# Patient Record
Sex: MALE | Race: WHITE | Employment: FULL TIME | URBAN - METROPOLITAN AREA
[De-identification: names, ages, dates, MRNs, and addresses within clinical notes are randomized per-mention and may not be internally consistent; named-entity substitution may affect disease eponyms.]

---

## 2017-09-14 ENCOUNTER — TRANSFERRED RECORDS (OUTPATIENT)
Dept: HEALTH INFORMATION MANAGEMENT | Facility: CLINIC | Age: 52
End: 2017-09-14

## 2017-09-28 ENCOUNTER — TRANSFERRED RECORDS (OUTPATIENT)
Dept: HEALTH INFORMATION MANAGEMENT | Facility: CLINIC | Age: 52
End: 2017-09-28

## 2017-10-17 ENCOUNTER — TRANSFERRED RECORDS (OUTPATIENT)
Dept: HEALTH INFORMATION MANAGEMENT | Facility: CLINIC | Age: 52
End: 2017-10-17

## 2018-06-01 ENCOUNTER — TRANSFERRED RECORDS (OUTPATIENT)
Dept: HEALTH INFORMATION MANAGEMENT | Facility: CLINIC | Age: 53
End: 2018-06-01

## 2018-07-08 ENCOUNTER — TELEPHONE (OUTPATIENT)
Dept: OTOLARYNGOLOGY | Facility: CLINIC | Age: 53
End: 2018-07-08

## 2018-07-09 NOTE — TELEPHONE ENCOUNTER
Health Call Center    Phone Message    May a detailed message be left on voicemail: yes    Reason for Call: Other: Received referral in Referrals on 7/6 from [facility]. Dr. Hanan Rosenstein is referring patient to ENT for Dizziness. Referral has been forwarded to the clinic. Please contact the patient directly for scheduling.     Action Taken: Message routed to:  [facility] ([facility] Clinic

## 2018-07-24 NOTE — TELEPHONE ENCOUNTER
Referring requesting update - Pt has not received any phone calls. Pt has records located at [facility].

## 2018-07-30 ENCOUNTER — TELEPHONE (OUTPATIENT)
Dept: OTOLARYNGOLOGY | Facility: CLINIC | Age: 53
End: 2018-07-30

## 2018-07-30 NOTE — TELEPHONE ENCOUNTER
Left patient voicemail regarding scheduling appointment for dizziness with Dr Nissen or Dr Rivera with an audio prior. Gave patient my direct phone number to contact.    7-30-18  Patient left voicemail stating he would be hard to get a hold of and to schedule him.    7-31-18  Left patient voicemail regarding scheduling appointments for dizziness. Gave him a date and time of Sep 4, 12:30 hearing test and 1:30 Dr Nissen. I also explained to patient I needed information where he has been seen, [facility], 2 ENT providers and if any imaging, audios, or balance testing was done. Gave patient my direct phone number to contact.      Tiffany  [facility] Coordinator

## 2018-08-18 ENCOUNTER — APPOINTMENT (OUTPATIENT)
Dept: GENERAL RADIOLOGY | Facility: CLINIC | Age: 53
End: 2018-08-18
Attending: EMERGENCY MEDICINE
Payer: COMMERCIAL

## 2018-08-18 ENCOUNTER — HOSPITAL ENCOUNTER (OUTPATIENT)
Facility: CLINIC | Age: 53
Setting detail: OBSERVATION
Discharge: HOME | End: 2018-08-19
Attending: EMERGENCY MEDICINE | Admitting: EMERGENCY MEDICINE
Payer: COMMERCIAL

## 2018-08-18 DIAGNOSIS — R07.9 CHEST PAIN, UNSPECIFIED TYPE: ICD-10-CM

## 2018-08-18 DIAGNOSIS — Z86.79 PERSONAL HISTORY OF CARDIOVASCULAR DISORDER: ICD-10-CM

## 2018-08-18 DIAGNOSIS — Z82.49 FAMILY HISTORY OF ISCHEMIC HEART DISEASE: ICD-10-CM

## 2018-08-18 LAB
ANION GAP SERPL CALCULATED.3IONS-SCNC: 7 MMOL/L (ref 3–14)
BASOPHILS # BLD AUTO: 0 10E9/L (ref 0–0.2)
BASOPHILS NFR BLD AUTO: 0.3 %
BUN SERPL-MCNC: 11 MG/DL (ref 7–30)
CALCIUM SERPL-MCNC: 9 MG/DL (ref 8.5–10.1)
CHLORIDE SERPL-SCNC: 104 MMOL/L (ref 94–109)
CO2 SERPL-SCNC: 27 MMOL/L (ref 20–32)
CREAT SERPL-MCNC: 0.8 MG/DL (ref 0.66–1.25)
DIFFERENTIAL METHOD BLD: ABNORMAL
EOSINOPHIL # BLD AUTO: 0.1 10E9/L (ref 0–0.7)
EOSINOPHIL NFR BLD AUTO: 2.5 %
ERYTHROCYTE [DISTWIDTH] IN BLOOD BY AUTOMATED COUNT: 13 % (ref 10–15)
GFR SERPL CREATININE-BSD FRML MDRD: >90 ML/MIN/1.7M2
GLUCOSE SERPL-MCNC: 96 MG/DL (ref 70–99)
HCT VFR BLD AUTO: 41.4 % (ref 40–53)
HGB BLD-MCNC: 14.2 G/DL (ref 13.3–17.7)
IMM GRANULOCYTES # BLD: 0 10E9/L (ref 0–0.4)
IMM GRANULOCYTES NFR BLD: 0 %
INTERPRETATION ECG - MUSE: NORMAL
LYMPHOCYTES # BLD AUTO: 1.3 10E9/L (ref 0.8–5.3)
LYMPHOCYTES NFR BLD AUTO: 39.5 %
MAGNESIUM SERPL-MCNC: 2 MG/DL (ref 1.6–2.3)
MCH RBC QN AUTO: 31.6 PG (ref 26.5–33)
MCHC RBC AUTO-ENTMCNC: 34.3 G/DL (ref 31.5–36.5)
MCV RBC AUTO: 92 FL (ref 78–100)
MONOCYTES # BLD AUTO: 0.3 10E9/L (ref 0–1.3)
MONOCYTES NFR BLD AUTO: 10.3 %
NEUTROPHILS # BLD AUTO: 1.5 10E9/L (ref 1.6–8.3)
NEUTROPHILS NFR BLD AUTO: 47.4 %
NRBC # BLD AUTO: 0 10*3/UL
NRBC BLD AUTO-RTO: 0 /100
PLATELET # BLD AUTO: 185 10E9/L (ref 150–450)
POTASSIUM SERPL-SCNC: 3.8 MMOL/L (ref 3.4–5.3)
RBC # BLD AUTO: 4.5 10E12/L (ref 4.4–5.9)
SODIUM SERPL-SCNC: 138 MMOL/L (ref 133–144)
TROPONIN I SERPL-MCNC: <0.015 UG/L (ref 0–0.04)
WBC # BLD AUTO: 3.2 10E9/L (ref 4–11)

## 2018-08-18 PROCEDURE — 85025 COMPLETE CBC W/AUTO DIFF WBC: CPT | Performed by: EMERGENCY MEDICINE

## 2018-08-18 PROCEDURE — 96374 THER/PROPH/DIAG INJ IV PUSH: CPT

## 2018-08-18 PROCEDURE — 93005 ELECTROCARDIOGRAM TRACING: CPT

## 2018-08-18 PROCEDURE — 80048 BASIC METABOLIC PNL TOTAL CA: CPT | Performed by: EMERGENCY MEDICINE

## 2018-08-18 PROCEDURE — 93010 ELECTROCARDIOGRAM REPORT: CPT | Performed by: INTERNAL MEDICINE

## 2018-08-18 PROCEDURE — 99285 EMERGENCY DEPT VISIT HI MDM: CPT | Mod: 25 | Performed by: EMERGENCY MEDICINE

## 2018-08-18 PROCEDURE — 36415 COLL VENOUS BLD VENIPUNCTURE: CPT | Performed by: NURSE PRACTITIONER

## 2018-08-18 PROCEDURE — 83735 ASSAY OF MAGNESIUM: CPT | Performed by: NURSE PRACTITIONER

## 2018-08-18 PROCEDURE — 99219 ZZC INITIAL OBSERVATION CARE,LEVL II: CPT | Mod: Z6 | Performed by: EMERGENCY MEDICINE

## 2018-08-18 PROCEDURE — 93005 ELECTROCARDIOGRAM TRACING: CPT | Performed by: EMERGENCY MEDICINE

## 2018-08-18 PROCEDURE — 25000128 H RX IP 250 OP 636: Performed by: NURSE PRACTITIONER

## 2018-08-18 PROCEDURE — G0378 HOSPITAL OBSERVATION PER HR: HCPCS

## 2018-08-18 PROCEDURE — 84484 ASSAY OF TROPONIN QUANT: CPT | Performed by: NURSE PRACTITIONER

## 2018-08-18 PROCEDURE — 25000132 ZZH RX MED GY IP 250 OP 250 PS 637: Performed by: EMERGENCY MEDICINE

## 2018-08-18 PROCEDURE — 96375 TX/PRO/DX INJ NEW DRUG ADDON: CPT

## 2018-08-18 PROCEDURE — 84484 ASSAY OF TROPONIN QUANT: CPT | Performed by: EMERGENCY MEDICINE

## 2018-08-18 PROCEDURE — 71046 X-RAY EXAM CHEST 2 VIEWS: CPT

## 2018-08-18 RX ORDER — ASPIRIN 325 MG
325 TABLET ORAL DAILY
Status: DISCONTINUED | OUTPATIENT
Start: 2018-08-19 | End: 2018-08-19 | Stop reason: HOSPADM

## 2018-08-18 RX ORDER — ALUMINA, MAGNESIA, AND SIMETHICONE 2400; 2400; 240 MG/30ML; MG/30ML; MG/30ML
30 SUSPENSION ORAL EVERY 4 HOURS PRN
Status: DISCONTINUED | OUTPATIENT
Start: 2018-08-18 | End: 2018-08-19 | Stop reason: HOSPADM

## 2018-08-18 RX ORDER — METOCLOPRAMIDE 10 MG/1
10 TABLET ORAL EVERY 6 HOURS PRN
Status: DISCONTINUED | OUTPATIENT
Start: 2018-08-18 | End: 2018-08-19 | Stop reason: HOSPADM

## 2018-08-18 RX ORDER — PROCHLORPERAZINE 25 MG
25 SUPPOSITORY, RECTAL RECTAL EVERY 12 HOURS PRN
Status: DISCONTINUED | OUTPATIENT
Start: 2018-08-18 | End: 2018-08-19 | Stop reason: HOSPADM

## 2018-08-18 RX ORDER — LIDOCAINE 40 MG/G
CREAM TOPICAL
Status: DISCONTINUED | OUTPATIENT
Start: 2018-08-18 | End: 2018-08-19 | Stop reason: HOSPADM

## 2018-08-18 RX ORDER — NITROGLYCERIN 0.4 MG/1
0.4 TABLET SUBLINGUAL EVERY 5 MIN PRN
Status: DISCONTINUED | OUTPATIENT
Start: 2018-08-18 | End: 2018-08-18

## 2018-08-18 RX ORDER — NITROGLYCERIN 0.4 MG/1
0.4 TABLET SUBLINGUAL EVERY 5 MIN PRN
Status: DISCONTINUED | OUTPATIENT
Start: 2018-08-18 | End: 2018-08-19 | Stop reason: HOSPADM

## 2018-08-18 RX ORDER — ONDANSETRON 4 MG/1
4 TABLET, ORALLY DISINTEGRATING ORAL EVERY 6 HOURS PRN
Status: DISCONTINUED | OUTPATIENT
Start: 2018-08-18 | End: 2018-08-19 | Stop reason: HOSPADM

## 2018-08-18 RX ORDER — PROCHLORPERAZINE MALEATE 5 MG
10 TABLET ORAL EVERY 6 HOURS PRN
Status: DISCONTINUED | OUTPATIENT
Start: 2018-08-18 | End: 2018-08-19 | Stop reason: HOSPADM

## 2018-08-18 RX ORDER — ACETAMINOPHEN 325 MG/1
650 TABLET ORAL EVERY 4 HOURS PRN
Status: DISCONTINUED | OUTPATIENT
Start: 2018-08-18 | End: 2018-08-19 | Stop reason: HOSPADM

## 2018-08-18 RX ORDER — ACETAMINOPHEN 650 MG/1
650 SUPPOSITORY RECTAL EVERY 4 HOURS PRN
Status: DISCONTINUED | OUTPATIENT
Start: 2018-08-18 | End: 2018-08-19 | Stop reason: HOSPADM

## 2018-08-18 RX ORDER — NALOXONE HYDROCHLORIDE 0.4 MG/ML
.1-.4 INJECTION, SOLUTION INTRAMUSCULAR; INTRAVENOUS; SUBCUTANEOUS
Status: DISCONTINUED | OUTPATIENT
Start: 2018-08-18 | End: 2018-08-19 | Stop reason: HOSPADM

## 2018-08-18 RX ORDER — ONDANSETRON 2 MG/ML
4 INJECTION INTRAMUSCULAR; INTRAVENOUS EVERY 6 HOURS PRN
Status: DISCONTINUED | OUTPATIENT
Start: 2018-08-18 | End: 2018-08-19 | Stop reason: HOSPADM

## 2018-08-18 RX ORDER — METOCLOPRAMIDE HYDROCHLORIDE 5 MG/ML
10 INJECTION INTRAMUSCULAR; INTRAVENOUS EVERY 6 HOURS PRN
Status: DISCONTINUED | OUTPATIENT
Start: 2018-08-18 | End: 2018-08-19 | Stop reason: HOSPADM

## 2018-08-18 RX ADMIN — NITROGLYCERIN 0.4 MG: 0.4 TABLET SUBLINGUAL at 09:46

## 2018-08-18 RX ADMIN — PROCHLORPERAZINE EDISYLATE 10 MG: 5 INJECTION INTRAMUSCULAR; INTRAVENOUS at 21:47

## 2018-08-18 RX ADMIN — ONDANSETRON HYDROCHLORIDE 4 MG: 2 INJECTION, SOLUTION INTRAMUSCULAR; INTRAVENOUS at 19:39

## 2018-08-18 ASSESSMENT — ENCOUNTER SYMPTOMS
HEADACHES: 0
COLOR CHANGE: 0
ABDOMINAL PAIN: 0
NAUSEA: 0
CONFUSION: 0
DIFFICULTY URINATING: 0
EYE REDNESS: 0
VOMITING: 0
FEVER: 0
SHORTNESS OF BREATH: 0
FATIGUE: 1
NECK STIFFNESS: 0
ARTHRALGIAS: 0

## 2018-08-18 NOTE — IP AVS SNAPSHOT
MRN:[id number]                      After Visit Summary   8/18/2018    Michael Freeman    MRN: [id number]           Thank you!     Thank you for choosing [facility] for your care. Our goal is always to provide you with excellent care. Hearing back from our patients is one way we can continue to improve our services. Please take a few minutes to complete the written survey that you may receive in the mail after you visit with us. Thank you!        Patient Information     Date Of Birth          1965        About your hospital stay     You were admitted on:  August 18, 2018 You last received care in the:  Unit 6D Observation [facility]    You were discharged on:  August 19, 2018        Reason for your hospital stay       Chest pain                  Who to Call     For medical emergencies, please call 911.  For non-urgent questions about your medical care, please call your primary care provider or clinic, [phone]          Attending Provider     Provider Specialty    Rene Posey DO Emergency Medicine    Jeff Nick MD Emergency Medicine       Primary Care Provider Office Phone # Fax #    Hanan J Rosenstein, [phone]       When to contact your care team       Please go to your nearest emergency room If you were to have a change in the type of chest pain i.e more severe, lasting longer or radiating to your shoulder, arm, neck, jaw or back, shortness of breath or increased pain with breathing, coughing up blood, feel dizzy or lightheaded, or notice swelling in one leg.                  After Care Instructions     Activity       Your activity upon discharge: As tolerated            Diet       Follow this diet upon discharge: Regular diet            Discharge Instructions       The chest pain you came into the emergency room for does not appear to be cardiac chest pain. Your heart enzymes were normal which tells us there was no damage to the heart muscle. Your  stress test showed possible bicuspid aortic valve with mild insufficiency. Recommend outpatient CTA to better assess aortic valve.                  Follow-up Appointments     Adult [facility] Follow-up and recommended labs and tests       Follow up with PCP in one week, return to the ER if having chest pain, syncope, shortness of breath or fever greater than 101F.     Appointments on [facility] and/or [facility] (with [facility] or [facility] provider or service). Call [phone] if you haven't heard regarding these appointments within 7 days of discharge.                  Your next 10 appointments already scheduled     Sep 04, 2018 12:30 PM CDT   Walk In From ENT with Fareed Montez   [facility] Audiology ([facility])    [address] [place] [address] [phone]            Sep 04, 2018  1:30 PM CDT   (Arrive by 1:15 PM)   NEW NEUROTOLOGY VISIT with Rick L Nissen, MD   [facility] Ear Nose and Throat ([facility])    [address] [place] [address] [phone]              Future tests that were ordered for you     CTA Chest with Contrast                 Pending Results     Date and Time Order Name Status Description    8/18/2018 1801 EKG 12-lead, tracing only Preliminary             Statement of Approval     Ordered          08/19/18 1252  I have reviewed and agree with all the recommendations and orders detailed in this document.  EFFECTIVE NOW     Approved and electronically signed by:  Danielle Alaniz APRN CNP             Admission Information     Date & Time Provider Department Dept. Phone    8/18/2018 Jeff Nick MD Unit 6D Observation [facility] [place] [phone]      Your Vitals Were     Blood Pressure Temperature Respirations Weight Pulse Oximetry       104/62 (BP Location: Right arm) 98.6  F (37  C) (Oral) 16 82.6 kg (182 lb) 98%       MyChart Information     [employer] lets you send messages to your  "doctor, view your test results, renew your prescriptions, schedule appointments and more. To sign up, go to [email or web address] . Click on \"Log in\" on the left side of the screen, which will take you to the Welcome page. Then click on \"Sign up Now\" on the right side of the page.     You will be asked to enter the access code listed below, as well as some personal information. Please follow the directions to create your username and password.     Your access code is: [email or web address]  Expires: 10/30/2018  2:31 PM     Your access code will  in 90 days. If you need help or a new code, please call your [facility] clinic or [phone].        Care EveryWhere ID     This is your Care EveryWhere ID. This could be used by other organizations to access your [facility] medical records  [id number]        Equal Access to Services     [facility] CenterADAIR : Harlan Santiago, arun cross, johanna pedroza, adan miller . So [facility] [phone].    ATENCIÓN: Si habla español, tiene a gonzalez disposición servicios gratuitos de asistencia lingüística. Pan al [phone].    We comply with applicable federal civil rights laws and [place] laws. We do not discriminate on the basis of race, color, national origin, age, disability, sex, sexual orientation, or gender identity.               Review of your medicines      CONTINUE these medicines which have NOT CHANGED        Dose / Directions    ASPIRIN PO        Dose:  325 mg   Take 325 mg by mouth   Refills:  0                Protect others around you: Learn how to safely use, store and throw away your medicines at www.disposemymeds.org.             Medication List: This is a list of all your medications and when to take them. Check marks below indicate your daily home schedule. Keep this list as a reference.      Medications           Morning Afternoon Evening Bedtime As Needed    ASPIRIN PO   Take 325 mg by mouth   Last time this was " given:  325 mg on 8/19/2018  8:50 AM

## 2018-08-18 NOTE — PROGRESS NOTES
List all goals to be met before discharge home:   - Serial troponins and stress test complete : No, two has bee negative.  - Seen and cleared by consultant if applicable : No.  - Adequate pain control on oral analgesia : Yes.  - Vital signs normal or at patient baseline : Yes.  - Safe disposition plan has been identified : No.  - Nurse to notify provider when observation goals have been met and patient is ready for discharge.

## 2018-08-18 NOTE — H&P
"[facility] ED Observation Admission Note    Chief Complaint   Patient presents with     Chest Pain     Arm Pain       Assessment/Plan:  Michael Freeman is a 53 year old male with a history of atrial fibrillation s/p ablation 2009 who presents for evaluation of chest pain.     1. Chest pain: Started on Monday.Intermittent chest pressure with left arm paresthesias. Also an episode of scalp numbness. His pain recurred yesterday afternoon and has been constant, though fluctuating in severity, since onset. He reports nausea. He denies vomiting. No leg pain or swelling. Reports a stress test in 2009 which was normal prior to his ablation. No recent travel. Extensive maternal family history of heart disease  - Serial troponins q4h x 2 more  - Continuous telemetry  - Exercise Stress Test in the morning  - Nitro PRN  - Continue PTA ASA 325mg daily  - Clear liquid diet after midnight  - PPI IV BID     Disposition: Home tomorrow if troponins negative and stress test negative.     HPI:    Per ED note \"Michael Freeman is a 53 year old male with a history of atrial fibrillation s/p ablation who presents for evaluation of chest pain. Patient complains of left-sided chest pain with associated left arm discomfort and weakness that has been intermittent since Monday, for the past 5 days. He has had associated fatigue as well as an episode of scalp numbness that has since resolved. He has not noticed any specific exacerbating or alleviating factors. He reports his episode of pain that occurred on Monday lasted for several hours and took a couple of days to subside. His pain recurred yesterday afternoon and has been constant, though fluctuating in severity, since onset. He denies nausea or vomiting. No leg pain or swelling. He takes daily 325 mg aspirin, and did take this medication this morning prior to arrival. No recent travel. He does have extensive maternal family history of heart disease.\"    In the ED   Vitals:BP: 129/83 Pulse: 62 Temp: " 97.4 SP02:99% Labs: Na 138, K 3.8, Cr 0.80, Trop <0.015 x 2, WBC 3.2, Hgb 14.2, Plt 185 Medications:Nitro x 1 Imaging: EKG: normal, Chest xray: Normal cardiac and mediastinal silhouette. Pulmonary  vasculature within normal limits. No acute airspace opacity. Presumed  calcified nodule in the left midlung. Consults: none Plan: Admit to ED observation for ACS r/o.     On admission to the observation unit the patient was stable. Denied any chest pain or arm pain.     History:    Past Medical History:   Diagnosis Date     Atrial fibrillation, unspecified type (H) 2009    has had an ablation       Past Surgical History:   Procedure Laterality Date     WISDOM ST GUIDEWIRE         No family history on file.    Social History     Social History     Marital status:      Spouse name: N/A     Number of children: N/A     Years of education: N/A     Occupational History     Not on file.     Social History Main Topics     Smoking status: Never Smoker     Smokeless tobacco: Never Used     Alcohol use Not on file     Drug use: Not on file     Sexual activity: Not on file     Other Topics Concern     Not on file     Social History Narrative     No narrative on file         No current facility-administered medications on file prior to encounter.   No current outpatient prescriptions on file prior to encounter.    Data:    Results for orders placed or performed during the hospital encounter of 08/18/18   CBC with platelets differential   Result Value Ref Range    WBC 3.2 (L) 4.0 - 11.0 10e9/L    RBC Count 4.50 4.4 - 5.9 10e12/L    Hemoglobin 14.2 13.3 - 17.7 g/dL    Hematocrit 41.4 40.0 - 53.0 %    MCV 92 78 - 100 fl    MCH 31.6 26.5 - 33.0 pg    MCHC 34.3 31.5 - 36.5 g/dL    RDW 13.0 10.0 - 15.0 %    Platelet Count 185 150 - 450 10e9/L    Diff Method Automated Method     % Neutrophils 47.4 %    % Lymphocytes 39.5 %    % Monocytes 10.3 %    % Eosinophils 2.5 %    % Basophils 0.3 %    % Immature Granulocytes 0.0 %    Nucleated  RBCs 0 0 /100    Absolute Neutrophil 1.5 (L) 1.6 - 8.3 10e9/L    Absolute Lymphocytes 1.3 0.8 - 5.3 10e9/L    Absolute Monocytes 0.3 0.0 - 1.3 10e9/L    Absolute Eosinophils 0.1 0.0 - 0.7 10e9/L    Absolute Basophils 0.0 0.0 - 0.2 10e9/L    Abs Immature Granulocytes 0.0 0 - 0.4 10e9/L    Absolute Nucleated RBC 0.0    Basic metabolic panel   Result Value Ref Range    Sodium 138 133 - 144 mmol/L    Potassium 3.8 3.4 - 5.3 mmol/L    Chloride 104 94 - 109 mmol/L    Carbon Dioxide 27 20 - 32 mmol/L    Anion Gap 7 3 - 14 mmol/L    Glucose 96 70 - 99 mg/dL    Urea Nitrogen 11 7 - 30 mg/dL    Creatinine 0.80 0.66 - 1.25 mg/dL    GFR Estimate >90 >60 mL/min/1.7m2    GFR Estimate If Black >90 >60 mL/min/1.7m2    Calcium 9.0 8.5 - 10.1 mg/dL   Troponin I   Result Value Ref Range    Troponin I ES <0.015 0.000 - 0.045 ug/L   EKG 12-lead, tracing only   Result Value Ref Range    Interpretation ECG Click View Image link to view waveform and result              EKG Interpretation:      Interpreted by Rene Posey  Time reviewed: 0924  Symptoms at time of EKG: Chest pain  Rhythm: Normal sinus rhythm  Rate: 61  Axis: Normal  Ectopy: none  Conduction: normal  ST Segments/ T Waves: No ST-T wave changes  Q Waves: none  Comparison to prior: No old EKG available     Clinical Impression: normal EKG  ROS:    Review Of Systems  Constitutional:Negative for fever.   HENT: Negative for congestion.    Eyes: Negative for redness.   Respiratory: Negative for shortness of breath.    Cardiovascular: Positive for chest pain (left-sided x5 days). Negative for leg swelling.   Gastrointestinal: Negative for abdominal pain, nausea and vomiting.   Genitourinary: Negative for difficulty urinating.   Musculoskeletal: Negative for arthralgias and neck stiffness.   Skin: Negative for color change.   Neurological: scalp numbness, left arm numbness  Psychiatric/Behavioral: Negative for confusion.     10 point ROS negative other than the symptoms noted  above.      Exam:    Vitals:  B/P: 95/76, T: 97.4, P: Data Unavailable, R: Data Unavailable    Constitutional: healthy, alert and no distress   Head: Normocephalic. No masses, lesions, tenderness or abnormalities   Neck: Neck supple. No adenopathy. Thyroid symmetric, normal size,, Carotids without bruits.   ENT: ENT exam normal, no neck nodes or sinus tenderness   Cardiovascular: RRR. No murmurs, clicks gallops or rub   Respiratory: . Good diaphragmatic excursion. Lungs clear   Gastrointestinal: Abdomen soft,. BS normal. No masses, organomegaly.   : Deferred   Musculoskeletal: extremities normal- no gross deformities noted, gait normal and normal muscle tone   Skin: no suspicious lesions or rashes   Neurologic: Gait normal. Reflexes normal and symmetric. Sensation grossly WNL.   Psychiatric: mentation appears normal and affect normal/bright   Hematologic/Lymphatic/Immunologic: normal ant/post cervical, axillary, supraclavicular and inguinal     Consults: None  FEN: Regular diet/Clear liquid diet at midnight  DVT prophylaxis: Early ambulation  Disposition: Home tomorrow if troponins negative and stress test negative.     Signed:  DEDRA Velasquez, NP  Emergency Department  [phone] Ex [id number]  August 18, 2018 at 12:43 PM    Addendum: Reported nausea, diaphoresis, dizziness. No events noted on cardiac monitoring. Troponin drawn. EKG repeated.

## 2018-08-18 NOTE — PROGRESS NOTES
Reason for admission: Chest pain.  Primary team notified of pt arrival.  Admitted from: ED.  Via: stretcher  Accompanied by: family  Belongings: Placed in closet.  Admission Profile: complete  Teaching: orientation to unit and call light- call light within reach, call don't fall, use of console, meal times, when to call for the RN, and enforced importance of safety   Access: PIV  Ht./Wt.: complete  Pt status: Stable.

## 2018-08-18 NOTE — ED PROVIDER NOTES
History     Chief Complaint   Patient presents with     Chest Pain     Arm Pain     HPI  Michael Freeman is a 53 year old male with a history of atrial fibrillation s/p ablation who presents for evaluation of chest pain. Patient complains of left-sided chest pain with associated left arm discomfort and weakness that has been intermittent since Monday, for the past 5 days. He has had associated fatigue as well as an episode of scalp numbness that has since resolved. He has not noticed any specific exacerbating or alleviating factors. He reports his episode of pain that occurred on Monday lasted for several hours and took a couple of days to subside. His pain recurred yesterday afternoon and has been constant, though fluctuating in severity, since onset. He denies nausea or vomiting. No leg pain or swelling. He takes daily 325 mg aspirin, and did take this medication this morning prior to arrival. No recent travel. He does have extensive maternal family history of heart disease.     I have reviewed the Medications, Allergies, Past Medical and Surgical History, and Social History in the [employer] system.  Past Medical History:   Diagnosis Date     Atrial fibrillation, unspecified type (H)     has had an ablation       History reviewed. No pertinent surgical history.    No family history on file.    Social History   Substance Use Topics     Smoking status: Never Smoker     Smokeless tobacco: Never Used     Alcohol use Not on file       Current Facility-Administered Medications   Medication     nitroGLYcerin (NITROSTAT) sublingual tablet 0.4 mg     Current Outpatient Prescriptions   Medication     ASPIRIN PO      No Known Allergies    Review of Systems   Constitutional: Positive for fatigue. Negative for fever.   HENT: Negative for congestion.    Eyes: Negative for redness.   Respiratory: Negative for shortness of breath.    Cardiovascular: Positive for chest pain (left-sided x5 days). Negative for leg swelling.    Gastrointestinal: Negative for abdominal pain, nausea and vomiting.   Genitourinary: Negative for difficulty urinating.   Musculoskeletal: Negative for arthralgias and neck stiffness.   Skin: Negative for color change.   Neurological: Negative for headaches.   Psychiatric/Behavioral: Negative for confusion.   All other systems reviewed and are negative.      Physical Exam   BP: 129/83  Heart Rate: 62  Temp: 97.4  F (36.3  C)  Weight: 82.6 kg (182 lb)  SpO2: 99 %      Physical Exam   Constitutional: No distress.   HENT:   Head: Atraumatic.   Mouth/Throat: Oropharynx is clear and moist. No oropharyngeal exudate.   Eyes: Pupils are equal, round, and reactive to light. No scleral icterus.   Cardiovascular: Normal heart sounds and intact distal pulses.    Pulmonary/Chest: Breath sounds normal. No respiratory distress.   Abdominal: Soft. Bowel sounds are normal. There is no tenderness.   Musculoskeletal: He exhibits no edema or tenderness.   Skin: Skin is warm. No rash noted. He is not diaphoretic.   Nursing note and vitals reviewed.      ED Course     ED Course     Procedures       9:29 AM  The patient was seen and examined by Dr. Posey in Room 22.          EKG Interpretation:      Interpreted by Rene Posey  Time reviewed: 0924  Symptoms at time of EKG: Chest pain  Rhythm: Normal sinus rhythm  Rate: 61  Axis: Normal  Ectopy: none  Conduction: normal  ST Segments/ T Waves: No ST-T wave changes  Q Waves: none  Comparison to prior: No old EKG available    Clinical Impression: normal EKG                Critical Care time:  none             Labs Ordered and Resulted from Time of ED Arrival Up to the Time of Departure from the ED - No data to display         Assessments & Plan (with Medical Decision Making)   This is a 53-year-old male with intermittent chest pain for the past week.  Pain was waxing and waning on its own.  He does have a strong family history of heart disease.  Patient has a history of atrial  fibrillation but is in normal sinus since an ablation.  He took a full dose aspirin today.  EKG showed no abnormalities.  Component was not elevated.  She did receive 1 sublingual nitro with relief from pain.  She is currently pain-free now.  Will admit for observation and further testing.    I have reviewed the nursing notes.    I have reviewed the findings, diagnosis, plan and need for follow up with the patient.    New Prescriptions    No medications on file       Final diagnoses:   None   IThais, am serving as a trained medical scribe to document services personally performed by Rene Psoey DO, based on the provider's statements to me.      Rene CESPEDES DO, was physically present and have reviewed and verified the accuracy of this note documented by Tahis King.      8/18/2018   [facility], [place], EMERGENCY DEPARTMENT     Rene Posey DO  08/18/18 1040

## 2018-08-18 NOTE — PROVIDER NOTIFICATION
At 1755 patient reported that his heart rate is going up to 70's to 80's and feeling of nauseous. Denies shortness of breath, denies pain. I/A Vital signs were taken. Ob's. NP notified. Plan: continue care.Update provider with change/concern.

## 2018-08-18 NOTE — IP AVS SNAPSHOT
Unit 6D Observation [address] [address]    Phone:  [phone]    Fax:  [phone]                                       After Visit Summary   8/18/2018    Michael Freeman    MRN: [id number]           After Visit Summary Signature Page     I have received my discharge instructions, and my questions have been answered. I have discussed any challenges I see with this plan with the nurse or doctor.    ..........................................................................................................................................  Patient/Patient Representative Signature      ..........................................................................................................................................  Patient Representative Print Name and Relationship to Patient    ..................................................               ................................................  Date                                            Time    ..........................................................................................................................................  Reviewed by Signature/Title    ...................................................              ..............................................  Date                                                            Time

## 2018-08-18 NOTE — PLAN OF CARE
Problem: Patient Care Overview  Goal: Individualization & Mutuality  Outpatient/Observation goals to be met before discharge home:    Serial troponins and stress test complete.No, pending the result for the 3rd troponin.   - Seen and cleared by consultant if applicable .No.  - Adequate pain control on oral analgesia.yes.  - Vital signs normal or at patient baseline.Yes.   - Safe disposition plan has been identified.yes, home.

## 2018-08-18 NOTE — ED TRIAGE NOTES
Pt arrived to the ED with c/o chest pain and left arm since Monday afternoon. Per pt the pain resolved on its own during the week and returned yesterday afternoon. On arrival vitals stable, afebrile. Pt alert and oriented x4. Reports chest pain comes and goes, no precipitating factors. Denies SOA. Vitals stable, afebrile. Hx of atrial fibulation with ablation.

## 2018-08-19 ENCOUNTER — APPOINTMENT (OUTPATIENT)
Dept: CARDIOLOGY | Facility: CLINIC | Age: 53
End: 2018-08-19
Attending: NURSE PRACTITIONER
Payer: COMMERCIAL

## 2018-08-19 VITALS
OXYGEN SATURATION: 98 % | DIASTOLIC BLOOD PRESSURE: 62 MMHG | RESPIRATION RATE: 16 BRPM | TEMPERATURE: 98.6 F | SYSTOLIC BLOOD PRESSURE: 104 MMHG | WEIGHT: 182 LBS

## 2018-08-19 PROCEDURE — 25500064 ZZH RX 255 OP 636: Performed by: EMERGENCY MEDICINE

## 2018-08-19 PROCEDURE — 93325 DOPPLER ECHO COLOR FLOW MAPG: CPT | Mod: 26 | Performed by: INTERNAL MEDICINE

## 2018-08-19 PROCEDURE — 93016 CV STRESS TEST SUPVJ ONLY: CPT | Performed by: INTERNAL MEDICINE

## 2018-08-19 PROCEDURE — 99217 ZZC OBSERVATION CARE DISCHARGE: CPT | Mod: Z6 | Performed by: EMERGENCY MEDICINE

## 2018-08-19 PROCEDURE — 96375 TX/PRO/DX INJ NEW DRUG ADDON: CPT

## 2018-08-19 PROCEDURE — G0378 HOSPITAL OBSERVATION PER HR: HCPCS

## 2018-08-19 PROCEDURE — 93018 CV STRESS TEST I&R ONLY: CPT | Performed by: INTERNAL MEDICINE

## 2018-08-19 PROCEDURE — 40000264 ECHO STRESS TEST WITH DEFINITY

## 2018-08-19 PROCEDURE — 93321 DOPPLER ECHO F-UP/LMTD STD: CPT | Mod: 26 | Performed by: INTERNAL MEDICINE

## 2018-08-19 PROCEDURE — 93350 STRESS TTE ONLY: CPT | Mod: 26 | Performed by: INTERNAL MEDICINE

## 2018-08-19 PROCEDURE — 25000125 ZZHC RX 250: Performed by: NURSE PRACTITIONER

## 2018-08-19 PROCEDURE — 25000132 ZZH RX MED GY IP 250 OP 250 PS 637: Performed by: NURSE PRACTITIONER

## 2018-08-19 RX ADMIN — PANTOPRAZOLE SODIUM 40 MG: 40 INJECTION, POWDER, FOR SOLUTION INTRAVENOUS at 08:51

## 2018-08-19 RX ADMIN — PERFLUTREN 5 ML: 6.52 INJECTION, SUSPENSION INTRAVENOUS at 11:18

## 2018-08-19 RX ADMIN — ASPIRIN 325 MG ORAL TABLET 325 MG: 325 PILL ORAL at 08:50

## 2018-08-19 NOTE — PLAN OF CARE
Problem: Patient Care Overview  Goal: Discharge Needs Assessment  - Serial troponins and stress test complete. No. Stress in am.  - Seen and cleared by consultant if applicable. No  - Adequate pain control on oral analgesia. Yes  - Vital signs normal or at patient baseline. Yes  - Safe disposition plan has been identified. No. Pending.

## 2018-08-19 NOTE — PROGRESS NOTES
All goals met for discharge. Discharge instructions given to patient and all questions answered. Patient able to verbalize understanding of instructions. PIV discontinued. All belongings with patient. Family here to provide ride home. Patient discharged to home.

## 2018-08-19 NOTE — PLAN OF CARE
Problem: Patient Care Overview  Goal: Plan of Care/Patient Progress Review  Outpatient/Observation goals to be met before discharge home:    - Serial troponins and stress test complete. Stress test complete. Results pending.  - Seen and cleared by consultant if applicable. No  - Adequate pain control on oral analgesia. Yes  - Vital signs normal or at patient baseline. Yes  - Safe disposition plan has been identified. No. Pending.

## 2018-08-19 NOTE — PLAN OF CARE
Problem: Patient Care Overview  Goal: Plan of Care/Patient Progress Review  Outpatient/Observation goals to be met before discharge home:    - Serial troponins and stress test complete. No. Stress in am.  - Seen and cleared by consultant if applicable. No  - Adequate pain control on oral analgesia. Yes  - Vital signs normal or at patient baseline. Yes  - Safe disposition plan has been identified. No. Pending.

## 2018-08-21 LAB — INTERPRETATION ECG - MUSE: NORMAL

## 2018-08-29 NOTE — TELEPHONE ENCOUNTER
FUTURE VISIT INFORMATION      FUTURE VISIT INFORMATION:    Date: 9-4-18    Time:     Location:   REFERRAL INFORMATION:    Referring provider:  ROSENSTEIN, HANAN J     Referring providers clinic:      Reason for visit/diagnosis  dizzy     RECORDS REQUESTED FROM:       Clinic name Comments Records Status Imaging Status   [facility] Office notes  received    ENT specialty care Office notes Received     [facility] Audio 9-14-17  Office notes Received                         RECORDS STATUS

## 2018-08-30 DIAGNOSIS — R42 DIZZINESS: Primary | ICD-10-CM

## 2018-09-04 ENCOUNTER — PRE VISIT (OUTPATIENT)
Dept: OTOLARYNGOLOGY | Facility: CLINIC | Age: 53
End: 2018-09-04

## 2018-09-04 ENCOUNTER — OFFICE VISIT (OUTPATIENT)
Dept: AUDIOLOGY | Facility: CLINIC | Age: 53
End: 2018-09-04
Payer: COMMERCIAL

## 2018-09-04 ENCOUNTER — OFFICE VISIT (OUTPATIENT)
Dept: OTOLARYNGOLOGY | Facility: CLINIC | Age: 53
End: 2018-09-04
Payer: COMMERCIAL

## 2018-09-04 VITALS — BODY MASS INDEX: 24.11 KG/M2 | WEIGHT: 178 LBS | HEIGHT: 72 IN

## 2018-09-04 DIAGNOSIS — H93.12 TINNITUS, LEFT: ICD-10-CM

## 2018-09-04 DIAGNOSIS — R42 DIZZINESS AND GIDDINESS: Primary | ICD-10-CM

## 2018-09-04 DIAGNOSIS — H93.8X2 EAR PRESSURE, LEFT: ICD-10-CM

## 2018-09-04 DIAGNOSIS — H61.23 EXCESSIVE CERUMEN IN BOTH EAR CANALS: ICD-10-CM

## 2018-09-04 DIAGNOSIS — H81.02 MENIERE'S DISEASE, LEFT: Primary | ICD-10-CM

## 2018-09-04 RX ORDER — B-COMPLEX WITH VITAMIN C
50 TABLET ORAL 2 TIMES DAILY WITH MEALS
Qty: 60 TABLET | Refills: 2 | Status: SHIPPED | OUTPATIENT
Start: 2018-09-04 | End: 2018-11-03

## 2018-09-04 RX ORDER — DIPHENHYDRAMINE HCL 25 MG
50 TABLET ORAL AT BEDTIME
Qty: 60 TABLET | Refills: 2 | Status: SHIPPED | OUTPATIENT
Start: 2018-09-04

## 2018-09-04 RX ORDER — TRIAMTERENE AND HYDROCHLOROTHIAZIDE 37.5; 25 MG/1; MG/1
1 CAPSULE ORAL DAILY
Qty: 60 CAPSULE | Refills: 2 | Status: SHIPPED | OUTPATIENT
Start: 2018-09-04 | End: 2018-10-04

## 2018-09-04 ASSESSMENT — PAIN SCALES - GENERAL: PAINLEVEL: NO PAIN (0)

## 2018-09-04 NOTE — PROGRESS NOTES
AUDIOLOGY REPORT    SUMMARY: Audiology visit completed. See audiogram for results.      RECOMMENDATIONS: Follow-up with ENT.      Zehra Montes.  Licensed Audiologist  [place] #[id number]

## 2018-09-04 NOTE — LETTER
9/4/2018     RE: Michael Freeman  [address]  [facility] [address]     Dear Colleague,    Thank you for referring your patient, Michael Freeman, to the [facility] EAR NOSE AND THROAT at [facility]. Please see a copy of my visit note below.    Dear Dr. Rosenstein, Hanan J:    I had the pleasure of meeting Michael Freeman in consultation today at the [facility] at your request.    HISTORY OF PRESENT ILLNESS: Patient is a 53-year-old in today for assessment of dizziness.  He is been having dizziness for about 4 years, feels it has been worse the past 6 months.  He describes the dizziness as an off balance sensation, feels he just loses his perception in space.  The episodes are very brief, lasting only seconds to several minutes.  The frequency varies from several in an hour to having none for several days.  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  He has had to pull over while driving.  He has no associated nausea or vomiting.  He does not notice any significant hearing issues or asymmetry.  He has had tinnitus only on the left side, describes it as a static sound.  It is intermittent, lasts seconds to minutes and  is not necessarily associated with the dizziness but seems to occur around that time.  Also has had left ear pressure that can be persistent for several days.  Again the pressure seems to be worse when he is having the dizziness.  He has had occasional dysphagia, is seeing a GI specialist and scheduled for upper endoscopy in the near future.  He denies any dysphasia, hoarseness, facial paresthesias.  He has had the EMG testing which showed 25% weakness in the left ear.  He has seen 2 other ENT physicians, one was suspicious for Ménière's but no treatment was offered.  He has had an Epley maneuver with no benefit.  He has been diagnosed with migraines in the past but has not had more than 2 or 3  headaches a year.  They are not associated with dizziness at all he feels.    ALLERGIES:    Allergies   Allergen Reactions     Chocolate Diarrhea     Egg Yolk Diarrhea     Garlic Diarrhea       HABITS:   Alcohol use Yes   Comment: very llittle 1 beer w dinner 3 times per week    History   Smoking Status     Never Smoker   Smokeless Tobacco     Never Used         PAST MEDICAL HISTORY: Please see today's intake form (for the remainder of the PMH) which I reviewed and signed.  Past Medical History:   Diagnosis Date     Atrial fibrillation, unspecified type (H) 2009    has had an ablation     Heart rate problem 5/1/2007    Fixed with ablation     Meniere's disease 2/1/2018    Not confirmed     Migraines 1/1/2005       FAMILY HISTORY/SOCIAL HISTORY:   Family History   Problem Relation Age of Onset     Cancer Father      Cancer Mother      HEART DISEASE Mother      Hypertension Mother      Substance Abuse Mother      Cancer Maternal Grandmother      Cancer Paternal Grandfather      Migraines Sister      Stomach Problem Sister     Social History     Social History     Marital status:      Spouse name: N/A     Number of children: N/A     Years of education: N/A     Occupational History     Not on file.     Social History Main Topics     Smoking status: Never Smoker     Smokeless tobacco: Never Used     Alcohol use Yes      Comment: very llittle 1 beer w dinner 3 times per week     Drug use: No     Sexual activity: Not Currently     Partners: Female     Birth control/ protection: Abstinence     Other Topics Concern     Not on file     Social History Narrative       REVIEW OF SYSTEMS: Patient Supplied Answers to Review of Systems   ENT ROS 8/22/2018   Neurology Dizzy spells, Headache   Ears, Nose, Throat Ringing/noise in ears, Nasal congestion or drainage, Trouble swallowing   Cardiopulmonary Chest pain   Allergy/Immunology Skin changes       The remainder of the 10 point ROS is negative    PHYSICIAL  EXAMINATION:  Constitutional: The patient was well-groomed and in no acute distress.   Skin: Warm and pink.  Psychiatric: The patient's affect was calm, cooperative, and appropriate.   Respiratory: Breathing comfortably without stridor or exertion of accessory muscles.  Eyes: Pupils were equal and reactive. Extraocular movement intact.   Head: Normocephalic and atraumatic. No lesions or scars.  Ears: Patient placed under the microscope for microscopic evaluation and cleaning of cerumen. Under high power magnification, the right ear was examined and cleaned of cerumen using curet, alligator forceps, and suction. After cleaning, TM is fully visualized and has normal position with normal middle ear aeration. The left ear was then cleaned and inspected using similar techniques and instruments. After cleaning of cerumen, the TM has normal position with normal aeration.  Nose: Sinuses were nontender. Anterior rhinoscopy revealed midline septum and absence of purulence or polyps.  Oral Cavity: Normal tongue, floor of mouth, buccal mucosa, and palate. No lesions or masses on inspection or palpation. No abnormal lymph tissue in the oropharynx.   Neck: The parotid is soft without masses. Supple with normal laryngeal and tracheal landmarks.   Lymphatic: There is no palpable lymphadenopathy or other masses in the neck.   Neurologic: Alert and oriented x 3. Cranial nerves III-XI within normal limits. Voice quality normal.  Cerebellar Function Tests:  Grossly normal    Audiogram:  Audiogram today reviewed with patient and shows normal hearing through all frequencies with discrimination levels within normal ranges. Normal tympanograms noted in each ear.        IMPRESSION AND PLAN:   1. Dizziness: Discussed possibility of left Ménière's with the left tinnitus and left ear pressure.  Discussed difficult to make this diagnosis without any definitive test available.  He has had this for some time with no help from other specialties.   I recommend we give Ménière's medications a trial to see if we will get any improvement.  Discussed this in detail and he agrees.  I am going to put him on Dyazide once a day, niacin 50 mg twice daily, Benadryl 50 mg at bedtime.  Also avoid caffeine and salt.  We will follow-up in 2 months to reassess.  May consider steroid if still suspicious for many years.  Discussed that today but he wanted to wait for now.  2. Left tinnitus: Suspicious for Ménière's, treated as above and monitor.  3. Left ear pressure: Suspicious for Ménière's, treated as above and monitor.  4. Excessive cerumen: Clean with normal exam after cleaning.  No further treatment needed, monitor.    Thank you very much for the opportunity to participate in the care of your patient.    Rick L Nissen MD    Answers for HPI/ROS submitted by the patient on 9/4/2018   PHQ-2 Score: 0

## 2018-09-04 NOTE — MR AVS SNAPSHOT
After Visit Summary   9/4/2018    Michael Freeman    MRN: [id number]           Patient Information     Date Of Birth          1965        Visit Information        Provider Department      9/4/2018 1:30 PM Nissen, Rick L, MD [facility]        Today's Diagnoses     Meniere's disease, left    -  1      Care Instructions    Doctor Nissen has prescribed Dyazide, Benadryl and Niacin and will see you again in 2 months with another hearing test.          Follow-ups after your visit        Your next 10 appointments already scheduled     Nov 06, 2018 10:00 AM CST   Walk In From ENT with Fareed De Guzman [facility] Audiology ([facility])    [address] [place] [id number] [phone]            Nov 06, 2018 11:00 AM CST   (Arrive by 10:45 AM)   RETURN NEUROTOLOGY with Rick L Nissen, MD M [facility] Ear Nose and Throat ([facility])    [address] [place] [id number] [phone]              Who to contact     Please call your clinic at [phone] to:    Ask questions about your health    Make or cancel appointments    Discuss your medicines    Learn about your test results    Speak to your doctor            Additional Information About Your Visit        [employer] Information     [employer] gives you secure access to your electronic health record. If you see a primary care provider, you can also send messages to your care team and make appointments. If you have questions, please call your primary care clinic.  If you do not have a primary care provider, please call [phone] and they will assist you.      [employer] is an electronic gateway that provides easy, online access to your medical records. With [employer], you can request a clinic appointment, read your test results, renew a prescription or communicate with your care team.     To access your existing account, please contact your  "[facility] or call [phone] for assistance.        Care EveryWhere ID     This is your Care EveryWhere ID. This could be used by other organizations to access your [place] medical records  [id number]        Your Vitals Were     Height BMI (Body Mass Index)                1.83 m (6' 0.05\") 24.11 kg/m2           Blood Pressure from Last 3 Encounters:   08/19/18 104/62    Weight from Last 3 Encounters:   09/04/18 80.7 kg (178 lb)   08/18/18 82.6 kg (182 lb)              Today, you had the following     No orders found for display         Today's Medication Changes          These changes are accurate as of 9/4/18  2:22 PM.  If you have any questions, ask your nurse or doctor.               Start taking these medicines.        Dose/Directions    diphenhydrAMINE 25 MG tablet   Commonly known as:  BENADRYL   Used for:  Meniere's disease, left   Started by:  Nissen, Rick L, MD        Dose:  50 mg   Take 2 tablets (50 mg) by mouth At Bedtime   Quantity:  60 tablet   Refills:  2       niacin 50 MG tablet   Used for:  Meniere's disease, left   Started by:  Nissen, Rick L, MD        Dose:  50 mg   Take 1 tablet (50 mg) by mouth 2 times daily (with meals)   Quantity:  60 tablet   Refills:  2       triamterene-hydrochlorothiazide 37.5-25 MG per capsule   Commonly known as:  DYAZIDE   Used for:  Meniere's disease, left   Started by:  Nissen, Rick L, MD        Dose:  1 capsule   Take 1 capsule by mouth daily   Quantity:  60 capsule   Refills:  2            Where to get your medicines      These medications were sent to [facility] [address] [place] [address] AT [address] [address]    Hours:  24-hours Phone:  [phone]     diphenhydrAMINE 25 MG tablet    niacin 50 MG tablet    triamterene-hydrochlorothiazide 37.5-25 MG per capsule                Primary Care Provider Office Phone # Fax #    Hanan J Rosenstein, [phone] " [phone]       [facility] THE DOCTOR [address] [address]        Equal Access to Services     AVELINA SANTIAGO : Hadii aad ku hadjohncandida Santiago, wabrandynda luqadaha, qaybta kaalmada pillosuyapaluis, adan juárezsharonnancy gonzalez. So [facility] [phone].    ATENCIÓN: Si habla español, tiene a gonzalez disposición servicios gratuitos de asistencia lingüística. Llame al [phone].    We comply with applicable federal civil rights laws and [place] laws. We do not discriminate on the basis of race, color, national origin, age, disability, sex, sexual orientation, or gender identity.            Thank you!     Thank you for choosing [facility] EAR NOSE AND THROAT  for your care. Our goal is always to provide you with excellent care. Hearing back from our patients is one way we can continue to improve our services. Please take a few minutes to complete the written survey that you may receive in the mail after your visit with us. Thank you!             Your Updated Medication List - Protect others around you: Learn how to safely use, store and throw away your medicines at www.disposemymeds.org.          This list is accurate as of 9/4/18  2:22 PM.  Always use your most recent med list.                   Brand Name Dispense Instructions for use Diagnosis    ASPIRIN PO      Take 325 mg by mouth        diphenhydrAMINE 25 MG tablet    BENADRYL    60 tablet    Take 2 tablets (50 mg) by mouth At Bedtime    Meniere's disease, left       niacin 50 MG tablet     60 tablet    Take 1 tablet (50 mg) by mouth 2 times daily (with meals)    Meniere's disease, left       triamterene-hydrochlorothiazide 37.5-25 MG per capsule    DYAZIDE    60 capsule    Take 1 capsule by mouth daily    Meniere's disease, left

## 2018-09-04 NOTE — NURSING NOTE
"Chief Complaint   Patient presents with     Consult     Dizziness      Height 1.83 m (6' 0.05\"), weight 80.7 kg (178 lb).    .Laci Vann LPN    "

## 2018-09-04 NOTE — MR AVS SNAPSHOT
After Visit Summary   9/4/2018    Michael Freeman    MRN: [id number]           Patient Information     Date Of Birth          1965        Visit Information        Provider Department      9/4/2018 12:30 PM Tona Stephens AuD M [facility] Audiology        Today's Diagnoses     Dizziness and giddiness    -  1       Follow-ups after your visit        Your next 10 appointments already scheduled     Nov 06, 2018 10:00 AM CST   Walk In From ENT with Fareed De Guzman [facility] Audiology ([facility])    [address] [place] [id number] [phone]            Nov 06, 2018 11:00 AM CST   (Arrive by 10:45 AM)   RETURN NEUROTOLOGY with Rick L Nissen, MD M [facility] Ear Nose and Throat ([facility])    [address] [place] [address] [phone]              Who to contact     Please call your clinic at [phone] to:    Ask questions about your health    Make or cancel appointments    Discuss your medicines    Learn about your test results    Speak to your doctor            Additional Information About Your Visit        [employer] Information     [employer] gives you secure access to your electronic health record. If you see a primary care provider, you can also send messages to your care team and make appointments. If you have questions, please call your primary care clinic.  If you do not have a primary care provider, please call [phone] and they will assist you.      [employer] is an electronic gateway that provides easy, online access to your medical records. With [employer], you can request a clinic appointment, read your test results, renew a prescription or communicate with your care team.     To access your existing account, please contact your [facility] or call [phone] for assistance.        Care EveryWhere ID     This is your Care EveryWhere ID. This could be used  by other organizations to access your [place] medical records  [id number]         Blood Pressure from Last 3 Encounters:   08/19/18 104/62    Weight from Last 3 Encounters:   09/04/18 80.7 kg (178 lb)   08/18/18 82.6 kg (182 lb)              We Performed the Following     AUDIOGRAM/TYMPANOGRAM - INTERFACE     [facility] Eval & Speech Recog (99268)     Tymps / Reflex   (92779)          Today's Medication Changes          These changes are accurate as of 9/4/18  2:26 PM.  If you have any questions, ask your nurse or doctor.               Start taking these medicines.        Dose/Directions    diphenhydrAMINE 25 MG tablet   Commonly known as:  BENADRYL   Used for:  Meniere's disease, left   Started by:  Nissen, Rick L, MD        Dose:  50 mg   Take 2 tablets (50 mg) by mouth At Bedtime   Quantity:  60 tablet   Refills:  2       niacin 50 MG tablet   Used for:  Meniere's disease, left   Started by:  Nissen, Rick L, MD        Dose:  50 mg   Take 1 tablet (50 mg) by mouth 2 times daily (with meals)   Quantity:  60 tablet   Refills:  2       triamterene-hydrochlorothiazide 37.5-25 MG per capsule   Commonly known as:  DYAZIDE   Used for:  Meniere's disease, left   Started by:  Nissen, Rick L, MD        Dose:  1 capsule   Take 1 capsule by mouth daily   Quantity:  60 capsule   Refills:  2            Where to get your medicines      [facility] [address] [place] [address] AT [address] [address]    Hours:  24-hours Phone:  [phone]     diphenhydrAMINE 25 MG tablet    niacin 50 MG tablet    triamterene-hydrochlorothiazide 37.5-25 MG per capsule                Primary Care Provider Office Phone # Fax #    Hanan J Rosenstein, [phone]       [facility] DOCTOR [address] [address]        Equal Access to Services     AVELINA SANTIAGO AH: arun De La Torre  america claynash murphykit ponceadan oconnor ah. So [facility] [phone].    ATENCIÓN: Si kary werner, tiene a gonzalez disposición servicios gratuitos de asistencia lingüística. Radhaame al [phone].    We comply with applicable federal civil rights laws and [place] laws. We do not discriminate on the basis of race, color, national origin, age, disability, sex, sexual orientation, or gender identity.            Thank you!     Thank you for choosing [facility] AUDIOLOGY  for your care. Our goal is always to provide you with excellent care. Hearing back from our patients is one way we can continue to improve our services. Please take a few minutes to complete the written survey that you may receive in the mail after your visit with us. Thank you!             Your Updated Medication List - Protect others around you: Learn how to safely use, store and throw away your medicines at www.disposemymeds.org.          This list is accurate as of 9/4/18  2:26 PM.  Always use your most recent med list.                   Brand Name Dispense Instructions for use Diagnosis    ASPIRIN PO      Take 325 mg by mouth        diphenhydrAMINE 25 MG tablet    BENADRYL    60 tablet    Take 2 tablets (50 mg) by mouth At Bedtime    Meniere's disease, left       niacin 50 MG tablet     60 tablet    Take 1 tablet (50 mg) by mouth 2 times daily (with meals)    Meniere's disease, left       triamterene-hydrochlorothiazide 37.5-25 MG per capsule    DYAZIDE    60 capsule    Take 1 capsule by mouth daily    Meniere's disease, left

## 2018-09-04 NOTE — PATIENT INSTRUCTIONS
Doctor Nissen has prescribed Dyazide, Benadryl and Niacin and will see you again in 2 months with another hearing test.

## 2018-09-07 ENCOUNTER — TELEPHONE (OUTPATIENT)
Dept: OTOLARYNGOLOGY | Facility: CLINIC | Age: 53
End: 2018-09-07

## 2018-09-07 NOTE — TELEPHONE ENCOUNTER
[facility] Call Center    Phone Message    May a detailed message be left on voicemail: yes    Reason for Call: Medication Question or concern regarding medication   Prescription Clarification  Name of Medication: niacin 50 MG tablet  Prescribing Provider: Nissen   Pharmacy: [facility]    What on the order needs clarification? They can not order the 50 mg, did you mean 500 mg?           Action Taken: Message routed to:  [facility] ([facility] ENT

## 2018-09-07 NOTE — TELEPHONE ENCOUNTER
Spoke with patient. Patient advised that Niacin dose was  50 mg and that it is available Over the counter. Patient states that [facility] doesn't have 50 mg and is ordering it. Patient is deciding on whether or not to got to a different pharmacy and by med OTC.Laci Vann LPN

## 2020-03-11 ENCOUNTER — HEALTH MAINTENANCE LETTER (OUTPATIENT)
Age: 55
End: 2020-03-11

## 2021-01-03 ENCOUNTER — HEALTH MAINTENANCE LETTER (OUTPATIENT)
Age: 56
End: 2021-01-03

## 2021-04-25 ENCOUNTER — HEALTH MAINTENANCE LETTER (OUTPATIENT)
Age: 56
End: 2021-04-25

## 2021-10-10 ENCOUNTER — HEALTH MAINTENANCE LETTER (OUTPATIENT)
Age: 56
End: 2021-10-10

## 2022-05-21 ENCOUNTER — HEALTH MAINTENANCE LETTER (OUTPATIENT)
Age: 57
End: 2022-05-21

## 2022-09-18 ENCOUNTER — HEALTH MAINTENANCE LETTER (OUTPATIENT)
Age: 57
End: 2022-09-18

## 2023-06-04 ENCOUNTER — HEALTH MAINTENANCE LETTER (OUTPATIENT)
Age: 58
End: 2023-06-04

## 2025-04-14 NOTE — PROGRESS NOTES
Dear Dr. Rosenstein, Hanan J:    I had the pleasure of meeting Michael Freeman in consultation today at the [facility] at your request.    HISTORY OF PRESENT ILLNESS: Patient is a 53-year-old in today for assessment of dizziness.  He is been having dizziness for about 4 years, feels it has been worse the past 6 months.  He describes the dizziness as an off balance sensation, feels he just loses his perception in space.  The episodes are very brief, lasting only seconds to several minutes.  The frequency varies from several in an hour to having none for several days.  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  He has had to pull over while driving.  He has no associated nausea or vomiting.  He does not notice any significant hearing issues or asymmetry.  He has had tinnitus only on the left side, describes it as a static sound.  It is intermittent, lasts seconds to minutes and  is not necessarily associated with the dizziness but seems to occur around that time.  Also has had left ear pressure that can be persistent for several days.  Again the pressure seems to be worse when he is having the dizziness.  He has had occasional dysphagia, is seeing a GI specialist and scheduled for upper endoscopy in the near future.  He denies any dysphasia, hoarseness, facial paresthesias.  He has had the EMG testing which showed 25% weakness in the left ear.  He has seen 2 other ENT physicians, one was suspicious for Ménière's but no treatment was offered.  He has had an Epley maneuver with no benefit.  He has been diagnosed with migraines in the past but has not had more than 2 or 3 headaches a year.  They are not associated with dizziness at all he feels.    ALLERGIES:    Allergies   Allergen Reactions     Chocolate Diarrhea     Egg Yolk Diarrhea     Garlic Diarrhea       HABITS:   Alcohol use Yes   Comment: very llittle 1 beer w dinner 3 times per week     History   Smoking Status     Never Smoker   Smokeless Tobacco     Never Used         PAST MEDICAL HISTORY: Please see today's intake form (for the remainder of the PMH) which I reviewed and signed.  Past Medical History:   Diagnosis Date     Atrial fibrillation, unspecified type (H) 2009    has had an ablation     Heart rate problem 5/1/2007    Fixed with ablation     Meniere's disease 2/1/2018    Not confirmed     Migraines 1/1/2005       FAMILY HISTORY/SOCIAL HISTORY:   Family History   Problem Relation Age of Onset     Cancer Father      Cancer Mother      HEART DISEASE Mother      Hypertension Mother      Substance Abuse Mother      Cancer Maternal Grandmother      Cancer Paternal Grandfather      Migraines Sister      Stomach Problem Sister     Social History     Social History     Marital status:      Spouse name: N/A     Number of children: N/A     Years of education: N/A     Occupational History     Not on file.     Social History Main Topics     Smoking status: Never Smoker     Smokeless tobacco: Never Used     Alcohol use Yes      Comment: very llittle 1 beer w dinner 3 times per week     Drug use: No     Sexual activity: Not Currently     Partners: Female     Birth control/ protection: Abstinence     Other Topics Concern     Not on file     Social History Narrative       REVIEW OF SYSTEMS: Patient Supplied Answers to Review of Systems   ENT ROS 8/22/2018   Neurology Dizzy spells, Headache   Ears, Nose, Throat Ringing/noise in ears, Nasal congestion or drainage, Trouble swallowing   Cardiopulmonary Chest pain   Allergy/Immunology Skin changes       The remainder of the 10 point ROS is negative    PHYSICIAL EXAMINATION:  Constitutional: The patient was well-groomed and in no acute distress.   Skin: Warm and pink.  Psychiatric: The patient's affect was calm, cooperative, and appropriate.   Respiratory: Breathing comfortably without stridor or exertion of accessory muscles.  Eyes: Pupils were  equal and reactive. Extraocular movement intact.   Head: Normocephalic and atraumatic. No lesions or scars.  Ears: Patient placed under the microscope for microscopic evaluation and cleaning of cerumen. Under high power magnification, the right ear was examined and cleaned of cerumen using curet, alligator forceps, and suction. After cleaning, TM is fully visualized and has normal position with normal middle ear aeration. The left ear was then cleaned and inspected using similar techniques and instruments. After cleaning of cerumen, the TM has normal position with normal aeration.  Nose: Sinuses were nontender. Anterior rhinoscopy revealed midline septum and absence of purulence or polyps.  Oral Cavity: Normal tongue, floor of mouth, buccal mucosa, and palate. No lesions or masses on inspection or palpation. No abnormal lymph tissue in the oropharynx.   Neck: The parotid is soft without masses. Supple with normal laryngeal and tracheal landmarks.   Lymphatic: There is no palpable lymphadenopathy or other masses in the neck.   Neurologic: Alert and oriented x 3. Cranial nerves III-XI within normal limits. Voice quality normal.  Cerebellar Function Tests:  Grossly normal    Audiogram:  Audiogram today reviewed with patient and shows normal hearing through all frequencies with discrimination levels within normal ranges. Normal tympanograms noted in each ear.        IMPRESSION AND PLAN:   1. Dizziness: Discussed possibility of left Ménière's with the left tinnitus and left ear pressure.  Discussed difficult to make this diagnosis without any definitive test available.  He has had this for some time with no help from other specialties.  I recommend we give Ménière's medications a trial to see if we will get any improvement.  Discussed this in detail and he agrees.  I am going to put him on Dyazide once a day, niacin 50 mg twice daily, Benadryl 50 mg at bedtime.  Also avoid caffeine and salt.  We will follow-up in 2  months to reassess.  May consider steroid if still suspicious for many years.  Discussed that today but he wanted to wait for now.  2. Left tinnitus: Suspicious for Ménière's, treated as above and monitor.  3. Left ear pressure: Suspicious for Ménière's, treated as above and monitor.  4. Excessive cerumen: Clean with normal exam after cleaning.  No further treatment needed, monitor.    Thank you very much for the opportunity to participate in the care of your patient.    Rick L Nissen MD        Answers for HPI/ROS submitted by the patient on 9/4/2018   PHQ-2 Score: 0     [FreeTextEntry1] : WDWN.  In no acute distress. Skin in RT portals intact. No erythema.